# Patient Record
Sex: FEMALE | URBAN - METROPOLITAN AREA
[De-identification: names, ages, dates, MRNs, and addresses within clinical notes are randomized per-mention and may not be internally consistent; named-entity substitution may affect disease eponyms.]

---

## 2020-12-08 ENCOUNTER — RECORDS - HEALTHEAST (OUTPATIENT)
Dept: LAB | Facility: CLINIC | Age: 20
End: 2020-12-08

## 2020-12-11 LAB — C TRACH DNA SPEC QL PROBE+SIG AMP: NEGATIVE

## 2022-07-14 ENCOUNTER — OFFICE VISIT (OUTPATIENT)
Dept: PHARMACY | Facility: PHYSICIAN GROUP | Age: 22
End: 2022-07-14

## 2022-07-14 DIAGNOSIS — F41.9 ANXIETY: ICD-10-CM

## 2022-07-14 DIAGNOSIS — F33.1 MODERATE EPISODE OF RECURRENT MAJOR DEPRESSIVE DISORDER (H): Primary | ICD-10-CM

## 2022-07-14 PROCEDURE — 99207 PR NO CHARGE LOS: CPT | Performed by: PHARMACIST

## 2022-07-14 NOTE — PROGRESS NOTES
Clinical Pharmacy Consult:                                                    Josy Palomino is a 21 year old female coming in for a clinical pharmacist consult.  She was referred to me from Dr. Winter.     Reason for Consult: Psychiatric medication review, education, and testing for psychotropic pharmacogenomic test.    Discussion:   Indication for testing: Depression F33.0 and Anxiety F41.1  Current medication(s): Fluoxetine 40 mg, buspirone 15 mg three times daily, doxepine 10 mg at bedtime.  Her mother is on fluoxetine and it works well for her.  Patient's reasons for doing pharmacogenetic testing : Not getting a significant improvement in symptoms with her current regimen.  Has noticed small improvements, but not what we would expect from being on higher doses of fluoxetine and buspirone.  Had little benefit from previously tried medications.  Patient's expectations from test results: Patient understands that the test won't tell us exactly which meds will work, or guarantee she wouldn't have side effects with a new med.  Understands that it can help narrow down medications that would be less likely to work, more likely to cause side effects, and can help guide decisions with medication changes.    Patient's concerns about test: cost/coverage.  She would like to check with her insurance if it's covered or not before we submit it.  Based on income (parents income, 4 people in household), it would likely cost about $250-299 if her insurance doesn't cover it.  Test tube ID number: [id number]    Insurance information:   Patient is the primary account [employer].      Therapies Tried and Response:   Trazodone - not effective  Fluoxetine - some benefit, but still very symptomatic  Buspirone - partially effective  doxepin - helping some, but possibly having some side effects.    Medication adherence: no issues    Education Provided:  - Potential impact of pharmacokinetic and pharmacodynamic genetic variation on  medication metabolism and action  - Reviewed genes and medications tested   - Reviewed what report will look like  - How information may be helpful in guiding treatment  - How results may be useful when reviewing other medications  - Limitations of test results on individual medication experience    Plan:   1.  Buccal swab obtained on 7/14. Order not entered on [employer] portal yet.  Hold off to send in the the order, patient would like to check on insurance coverage of the test first.   2.  Asked patient to call me either way to let me know if she'd like to proceed with submitting the test, or wants to cancel it.  I will hold onto it until I hear back from her.    Gloria Murillo, PharmD  Medication Therapy Management Pharmacist  Pager: [phone]

## 2024-12-20 ENCOUNTER — HOSPITAL ENCOUNTER (EMERGENCY)
Facility: CLINIC | Age: 24
Discharge: HOME | End: 2024-12-20
Attending: EMERGENCY MEDICINE | Admitting: EMERGENCY MEDICINE
Payer: OTHER MISCELLANEOUS

## 2024-12-20 VITALS
HEIGHT: 69 IN | DIASTOLIC BLOOD PRESSURE: 82 MMHG | TEMPERATURE: 98.5 F | WEIGHT: 154.32 LBS | OXYGEN SATURATION: 99 % | RESPIRATION RATE: 16 BRPM | BODY MASS INDEX: 22.86 KG/M2 | SYSTOLIC BLOOD PRESSURE: 126 MMHG | HEART RATE: 71 BPM
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
## 2024-12-20 PROCEDURE — 99283 EMERGENCY DEPT VISIT LOW MDM: CPT | Performed by: EMERGENCY MEDICINE

## 2024-12-20 PROCEDURE — 99282 EMERGENCY DEPT VISIT SF MDM: CPT | Performed by: EMERGENCY MEDICINE

## 2024-12-20 RX ORDER — BUPROPION HYDROCHLORIDE 300 MG/1
300 TABLET ORAL EVERY MORNING
COMMUNITY

## 2024-12-20 RX ORDER — BUSPIRONE HYDROCHLORIDE 7.5 MG/1
7.5 TABLET ORAL 3 TIMES DAILY
COMMUNITY

## 2024-12-20 ASSESSMENT — ACTIVITIES OF DAILY LIVING (ADL): ADLS_ACUITY_SCORE: 41

## 2024-12-20 ASSESSMENT — COLUMBIA-SUICIDE SEVERITY RATING SCALE - C-SSRS
6. HAVE YOU EVER DONE ANYTHING, STARTED TO DO ANYTHING, OR PREPARED TO DO ANYTHING TO END YOUR LIFE?: NO
1. IN THE PAST MONTH, HAVE YOU WISHED YOU WERE DEAD OR WISHED YOU COULD GO TO SLEEP AND NOT WAKE UP?: NO
2. HAVE YOU ACTUALLY HAD ANY THOUGHTS OF KILLING YOURSELF IN THE PAST MONTH?: NO

## 2024-12-20 NOTE — ED TRIAGE NOTES
Pt ambulatory to triage c/o cat scratches and needle injury. 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 A needle was used on the cat and then entered the pts L pinky.     VSS, afebrile.     Triage Assessment (Adult)       Row Name 12/20/24 0323          Triage Assessment    Airway WDL WDL        Respiratory WDL    Respiratory WDL WDL        Peripheral/Neurovascular WDL    Peripheral Neurovascular WDL WDL

## 2024-12-20 NOTE — DISCHARGE INSTRUCTIONS
Please make an appointment to follow up with your employee health provider as soon as possible.    Return to the ED for worsening redness, drainage, odor, pain, swelling, or fever.

## 2024-12-20 NOTE — ED PROVIDER NOTES
"ED Provider Note  [facility]      History     Chief Complaint   Patient presents with    Hand Injury     HPI  Josy Palomino is a 24 year old female presenting from a veterinary emergency department with a needlestick injury and scratch from a cat.  The patient was helping restrain a cat as it was an intravenous medication to treat nausea.  The cat had ingested some uncooked sourdough and had to be given medications to induce vomiting.  The cat was otherwise healthy and up-to-date with all of its vaccinations.  The patient is also fully vaccinated.  The cat scratched the patient on the right wrist and there is a small puncture wound where the cat clawed part of her right hand.  She also had a through and through puncture of her left small finger with a needle contaminated by blood since it was being used to give intravenous medication to the cat.  All of her wounds were cleaned prior to arrival.  She is experiencing some soreness around the scratches in the puncture wound.    Past Medical History  No past medical history on file.  No past surgical history on file.  buPROPion (WELLBUTRIN XL) 300 MG 24 hr tablet  busPIRone (BUSPAR) 7.5 MG tablet      Allergies   Allergen Reactions    Cats Itching and Rash     Family History  No family history on file.  Social History       A medically appropriate review of systems was performed with pertinent positives and negatives noted in the HPI, and all other systems negative.    Physical Exam   BP: 126/82  Pulse: 71  Temp: 98.5  F (36.9  C)  Resp: 16  Height: 175.3 cm (5' 9\")  Weight: 70 kg (154 lb 5.2 oz)  SpO2: 99 %  Physical Exam  Vitals and nursing note reviewed.   Constitutional:       General: She is not in acute distress.     Appearance: Normal appearance. She is well-developed. She is not ill-appearing or diaphoretic.   HENT:      Head: Normocephalic and atraumatic.      Nose: Nose normal.      Mouth/Throat:      Mouth: Mucous membranes are " moist.   Eyes:      General: No scleral icterus.     Conjunctiva/sclera: Conjunctivae normal.   Cardiovascular:      Rate and Rhythm: Normal rate.   Pulmonary:      Effort: Pulmonary effort is normal. No respiratory distress.      Breath sounds: No stridor.   Abdominal:      General: There is no distension.   Musculoskeletal:         General: No deformity or signs of injury. Normal range of motion.      Right hand: Tenderness present. No swelling, deformity or bony tenderness. Normal range of motion. Normal strength. Normal sensation. Normal capillary refill.      Left hand: Tenderness present. No swelling, deformity or bony tenderness. Normal range of motion. Normal sensation.        Arms:         Hands:       Cervical back: Normal range of motion and neck supple. No rigidity.      Comments: Excoriations over right wrist with crusted blood. No active bleeding. No erythema or edema. Puncture wound over right thenar eminence. No extremity swelling. Through and through puncture wound to left small finger distal phalanx. No nail injury. No active bleeding.   Skin:     General: Skin is warm and dry.      Coloration: Skin is not jaundiced or pale.      Findings: No erythema or rash.   Neurological:      General: No focal deficit present.      Mental Status: She is alert and oriented to person, place, and time.   Psychiatric:         Mood and Affect: Mood normal.         Behavior: Behavior normal.           ED Course, Procedures, & Data      Procedures                No results found for any visits on 12/20/24.  Medications - No data to display  Labs Ordered and Resulted from Time of ED Arrival to Time of ED Departure - No data to display  No orders to display              Assessment & Plan    Josy Palomino is a 24 year old female presenting from a veterinary emergency department with a needlestick injury and scratch from a cat.      Ddx: Cat scratch, puncture wound, needlestick injury, body fluid exposure    Patient  well-appearing on arrival.  No active bleeding.  No concern for rabies exposure as the cat's vaccination history is known. No evidence of acute wound infection on examination.  TDAP UTD.  Discussed with the infectious disease staff who did not recommend any antibiotic prophylaxis for the cat scratch since the patient was certain there was no cat bite.  There is also no labs or further evaluation required regarding the exposure to cat blood.  Recommend normal wound management and monitoring for signs of infection.  Patient's wounds were irrigated and dressed.  Discussed monitoring for signs of worsening infection.  Follow-up recommended with patient's employee health provider.  Return for worsening pain, drainage, redness, warmth, swelling.      I have reviewed the nursing notes. I have reviewed the findings, diagnosis, plan and need for follow up with the patient.    Discharge Medication List as of 12/20/2024  4:48 AM          Final diagnoses:   Work related injury   Cat scratch   Needlestick injury accident with exposure to body fluid       Albertina Curry  [facility] EMERGENCY DEPARTMENT  12/20/2024     Albertina Curry MD  12/20/24 0457

## 2024-12-20 NOTE — LETTER
2000      To Whom it may concern:    Josy Palomino was seen in our Emergency Department today, 12/20/24 for an injury that was reported to be work related.      The injury occurred on 12/20/24.  Diagnosis: cat scratch and needlestick injury to finger with exposure to body fluid.      She may return to work.    After returning the following restrictions apply:  Should not expose open skin wounds to body fluids or chemicals.   Should not lift, restrain, or utilize hands in any way that causes her significant pain.    The employee must keep the injured site clean and dry.      Sincerely,